# Patient Record
Sex: FEMALE
[De-identification: names, ages, dates, MRNs, and addresses within clinical notes are randomized per-mention and may not be internally consistent; named-entity substitution may affect disease eponyms.]

---

## 2019-01-10 ENCOUNTER — APPOINTMENT (OUTPATIENT)
Dept: HEART AND VASCULAR | Facility: CLINIC | Age: 59
End: 2019-01-10
Payer: COMMERCIAL

## 2019-01-10 VITALS
WEIGHT: 158 LBS | SYSTOLIC BLOOD PRESSURE: 122 MMHG | HEIGHT: 63 IN | HEART RATE: 97 BPM | BODY MASS INDEX: 28 KG/M2 | RESPIRATION RATE: 16 BRPM | DIASTOLIC BLOOD PRESSURE: 78 MMHG

## 2019-01-10 DIAGNOSIS — R06.09 OTHER FORMS OF DYSPNEA: ICD-10-CM

## 2019-01-10 PROBLEM — Z00.00 ENCOUNTER FOR PREVENTIVE HEALTH EXAMINATION: Status: ACTIVE | Noted: 2019-01-10

## 2019-01-10 PROCEDURE — 99203 OFFICE O/P NEW LOW 30 MIN: CPT

## 2019-01-10 PROCEDURE — ZZZZZ: CPT

## 2019-01-10 NOTE — DISCUSSION/SUMMARY
[FreeTextEntry1] : Assessment/Plan:\par \par Patient is a 52 year old female without medical history here for cardiac evaluation.\par \par -Complaining of exertional SOB and fatigue. EKG without ischemic changes. Check echo - check EF, r/o structural heart disease\par \par - BP controlled\par \par FU 1 month

## 2019-01-10 NOTE — PHYSICAL EXAM
[General Appearance - Well Developed] : well developed [Normal Appearance] : normal appearance [Well Groomed] : well groomed [General Appearance - Well Nourished] : well nourished [No Deformities] : no deformities [General Appearance - In No Acute Distress] : no acute distress [Normal Conjunctiva] : the conjunctiva exhibited no abnormalities [Eyelids - No Xanthelasma] : the eyelids demonstrated no xanthelasmas [Normal Oral Mucosa] : normal oral mucosa [No Oral Pallor] : no oral pallor [No Oral Cyanosis] : no oral cyanosis [Normal Jugular Venous A Waves Present] : normal jugular venous A waves present [Normal Jugular Venous V Waves Present] : normal jugular venous V waves present [No Jugular Venous Dickinson A Waves] : no jugular venous dickinson A waves [Heart Rate And Rhythm] : heart rate and rhythm were normal [Heart Sounds] : normal S1 and S2 [Murmurs] : no murmurs present [Respiration, Rhythm And Depth] : normal respiratory rhythm and effort [Exaggerated Use Of Accessory Muscles For Inspiration] : no accessory muscle use [Auscultation Breath Sounds / Voice Sounds] : lungs were clear to auscultation bilaterally [Abdomen Soft] : soft [Abdomen Tenderness] : non-tender [Abdomen Mass (___ Cm)] : no abdominal mass palpated [Abnormal Walk] : normal gait [Gait - Sufficient For Exercise Testing] : the gait was sufficient for exercise testing [Nail Clubbing] : no clubbing of the fingernails [Cyanosis, Localized] : no localized cyanosis [Petechial Hemorrhages (___cm)] : no petechial hemorrhages [Skin Color & Pigmentation] : normal skin color and pigmentation [] : no rash [No Venous Stasis] : no venous stasis [Skin Lesions] : no skin lesions [No Skin Ulcers] : no skin ulcer [No Xanthoma] : no  xanthoma was observed [Oriented To Time, Place, And Person] : oriented to person, place, and time [Affect] : the affect was normal [Mood] : the mood was normal [No Anxiety] : not feeling anxious

## 2019-01-10 NOTE — HISTORY OF PRESENT ILLNESS
[FreeTextEntry1] : Patient is a 52 year old female without medical history here for cardiac evaluation. She complains of occasional shortness of breath and fatigue when walking 20 mins, climbing 2 flight of stairs, relieved with rest. No overt chest pain. Symptoms ongoing for last few months. No dizziness,palpitations.

## 2019-02-28 ENCOUNTER — APPOINTMENT (OUTPATIENT)
Dept: HEART AND VASCULAR | Facility: CLINIC | Age: 59
End: 2019-02-28

## 2019-02-28 PROBLEM — Z00.00 ENCOUNTER FOR PREVENTIVE HEALTH EXAMINATION: Status: ACTIVE | Noted: 2019-02-28

## 2020-09-10 ENCOUNTER — APPOINTMENT (OUTPATIENT)
Dept: HEART AND VASCULAR | Facility: CLINIC | Age: 60
End: 2020-09-10
Payer: MEDICAID

## 2020-09-10 DIAGNOSIS — R06.00 DYSPNEA, UNSPECIFIED: ICD-10-CM

## 2020-09-10 PROCEDURE — 99203 OFFICE O/P NEW LOW 30 MIN: CPT

## 2020-09-10 PROCEDURE — 93000 ELECTROCARDIOGRAM COMPLETE: CPT

## 2020-09-16 VITALS
WEIGHT: 175 LBS | HEART RATE: 87 BPM | SYSTOLIC BLOOD PRESSURE: 150 MMHG | DIASTOLIC BLOOD PRESSURE: 90 MMHG | BODY MASS INDEX: 32.2 KG/M2 | HEIGHT: 62 IN | RESPIRATION RATE: 16 BRPM

## 2020-09-16 PROBLEM — R06.00 DYSPNEA ON MINIMAL EXERTION: Status: ACTIVE | Noted: 2020-09-16

## 2020-10-15 ENCOUNTER — APPOINTMENT (OUTPATIENT)
Dept: HEART AND VASCULAR | Facility: CLINIC | Age: 60
End: 2020-10-15
Payer: MEDICAID

## 2020-10-15 DIAGNOSIS — I10 ESSENTIAL (PRIMARY) HYPERTENSION: ICD-10-CM

## 2020-10-15 PROCEDURE — 99213 OFFICE O/P EST LOW 20 MIN: CPT

## 2020-10-26 VITALS
WEIGHT: 162 LBS | RESPIRATION RATE: 16 BRPM | DIASTOLIC BLOOD PRESSURE: 80 MMHG | SYSTOLIC BLOOD PRESSURE: 128 MMHG | BODY MASS INDEX: 28.7 KG/M2 | HEART RATE: 94 BPM

## 2020-10-26 PROBLEM — I10 BENIGN ESSENTIAL HYPERTENSION: Status: ACTIVE | Noted: 2020-10-26

## 2020-10-26 RX ORDER — LISINOPRIL 10 MG/1
10 TABLET ORAL DAILY
Qty: 1 | Refills: 0 | Status: ACTIVE | COMMUNITY
Start: 2020-10-26

## 2020-10-26 NOTE — DISCUSSION/SUMMARY
[FreeTextEntry1] : Assessment/Plan:\par \par Patient is a 52 year old female without medical history here for follow up\par \par -ECHO reviewed: EF 60, impaired relaxation. Exertional dyspnea likely due to deconditioning. Encouraged increased exercise capacity\par \par -HTN: BP controlled on lisinopril\par \par Cardiac status stable. FU 6 months

## 2020-10-26 NOTE — PHYSICAL EXAM
[General Appearance - Well Developed] : well developed [Normal Appearance] : normal appearance [Well Groomed] : well groomed [General Appearance - Well Nourished] : well nourished [No Deformities] : no deformities [General Appearance - In No Acute Distress] : no acute distress [Normal Conjunctiva] : the conjunctiva exhibited no abnormalities [Eyelids - No Xanthelasma] : the eyelids demonstrated no xanthelasmas [Normal Oral Mucosa] : normal oral mucosa [No Oral Pallor] : no oral pallor [No Oral Cyanosis] : no oral cyanosis [Normal Jugular Venous A Waves Present] : normal jugular venous A waves present [Normal Jugular Venous V Waves Present] : normal jugular venous V waves present [No Jugular Venous Dickinson A Waves] : no jugular venous dickinson A waves [Respiration, Rhythm And Depth] : normal respiratory rhythm and effort [Exaggerated Use Of Accessory Muscles For Inspiration] : no accessory muscle use [Auscultation Breath Sounds / Voice Sounds] : lungs were clear to auscultation bilaterally [Heart Rate And Rhythm] : heart rate and rhythm were normal [Heart Sounds] : normal S1 and S2 [Murmurs] : no murmurs present [Abdomen Soft] : soft [Abdomen Tenderness] : non-tender [Abdomen Mass (___ Cm)] : no abdominal mass palpated [Abnormal Walk] : normal gait [Gait - Sufficient For Exercise Testing] : the gait was sufficient for exercise testing [Nail Clubbing] : no clubbing of the fingernails [Cyanosis, Localized] : no localized cyanosis [Petechial Hemorrhages (___cm)] : no petechial hemorrhages [Skin Color & Pigmentation] : normal skin color and pigmentation [] : no rash [No Venous Stasis] : no venous stasis [Skin Lesions] : no skin lesions [No Skin Ulcers] : no skin ulcer [No Xanthoma] : no  xanthoma was observed [Oriented To Time, Place, And Person] : oriented to person, place, and time [Affect] : the affect was normal [Mood] : the mood was normal [No Anxiety] : not feeling anxious

## 2020-10-26 NOTE — HISTORY OF PRESENT ILLNESS
[FreeTextEntry1] : Patient is a 52 year old female without medical history here for follow up  echo results. Complaining of occasional shortness of breath and fatigue when walking 20 mins, climbing 2 flight of stairs, relieved with rest. No overt chest pain. Symptoms ongoing for last few months. No dizziness,palpitations.

## 2023-03-13 VITALS
TEMPERATURE: 98 F | SYSTOLIC BLOOD PRESSURE: 159 MMHG | RESPIRATION RATE: 16 BRPM | WEIGHT: 169.98 LBS | HEART RATE: 82 BPM | HEIGHT: 64 IN | DIASTOLIC BLOOD PRESSURE: 85 MMHG | OXYGEN SATURATION: 100 %

## 2023-03-13 RX ORDER — CHLORHEXIDINE GLUCONATE 213 G/1000ML
1 SOLUTION TOPICAL ONCE
Refills: 0 | Status: DISCONTINUED | OUTPATIENT
Start: 2023-03-23 | End: 2023-04-06

## 2023-03-13 NOTE — H&P ADULT - NSHPLABSRESULTS_GEN_ALL_CORE
LABS:                          12.7   9.60  )-----------( 275      ( 23 Mar 2023 08:04 )             40.4     03-23    141  |  102  |  x   ----------------------------<  x   4.1   |  x   |  x       EKG - NSR 73 bpm, Qw in I, AVL; TWI in III

## 2023-03-13 NOTE — H&P ADULT - HISTORY OF PRESENT ILLNESS
Cardiologist: Dr. Shaik Andres Singh  Pharmacy: [facility] ([address] [place] [id number])  Escort:    62 y/o Female w/ PMHx of HTN, Type 2 Diabetes, Carotid artery occlusion presented to outpatient cardiologist c/o chest pain a/w SOB. Patient underwent TTE 2/6/23: LVEF 61%, mild MR/TR. Patient subsequently underwent NST 2/25/23 revealing moderate anteroseptal and inferior ischemia noted, post-gated LVEF 68%. Denies headache, changes in vision, palpitations, abdominal pain, N/V/D, leg pain/edema or any other complaints. In light of pt's risk factors, CCS class ___ anginal/anginal-equivalent symptoms and abnormal NST; pt referred to [facility] for recommended cardiac catheterization w/ possible intervention if clinically indicated.  USED - [id number]    Cardiologist: Dr. Shaik Andres Singh  Pharmacy: [facility] ([address] [place] [id number])  Escort: Maria Victoria (friend)    64 y/o Female w/ PMHx of HTN, HLD, Type 2 Diabetes, Carotid artery occlusion presented to outpatient cardiologist c/o chest pain a/w SOB. Patient underwent TTE 2/6/23: LVEF 61%, mild MR/TR. Patient subsequently underwent NST 2/25/23 revealing moderate anteroseptal and inferior ischemia noted, post-gated LVEF 68%. Denies headache, changes in vision, palpitations, abdominal pain, N/V/D, leg pain/edema or any other complaints. In light of pt's risk factors, CCS class 3 anginal/anginal-equivalent symptoms and abnormal NST; pt referred to [facility] for recommended cardiac catheterization w/ possible intervention if clinically indicated.

## 2023-03-13 NOTE — H&P ADULT - ASSESSMENT
62 y/o Female w/ PMHx of HTN, HLD, Type 2 Diabetes, Carotid artery occlusion presents for coronary catheterization with possible intervention in light of pt risk factors, CCS Class III anginal symptoms, and known abnormal NST.    - ASA 2 Malampati 2  - VSS  - Patient is a candidate for moderate sedation  - Labs and EKG reviewed by PA  - LOAD - Aspirin 81 mg (long time adherence, missed AM dose), Plavix 600 mg   - FLUIDS -  ml bolus given x1; NS 75 ml/hr given x 2hr     Risks & benefits of procedure and alternative therapy have been explained to the patient including but not limited to: allergic reaction, bleeding w/possible need for blood transfusion, infection, renal and vascular compromise, limb damage, arrhythmia, stroke, vessel dissection/perforation, Myocardial infarction, emergent CABG. Informed consent obtained and in chart.

## 2023-03-13 NOTE — H&P ADULT - NSICDXPASTMEDICALHX_GEN_ALL_CORE_FT
PAST MEDICAL HISTORY:  Carotid artery occlusion     HLD (hyperlipidemia)     HTN (hypertension)     Type 2 diabetes mellitus

## 2023-03-23 ENCOUNTER — OUTPATIENT (OUTPATIENT)
Dept: OUTPATIENT SERVICES | Facility: HOSPITAL | Age: 63
LOS: 1 days | Discharge: HOME | End: 2023-03-23
Payer: MEDICAID

## 2023-03-23 LAB
A1C WITH ESTIMATED AVERAGE GLUCOSE RESULT: 6.1 % — HIGH (ref 4–5.6)
ALBUMIN SERPL ELPH-MCNC: 4.4 G/DL — SIGNIFICANT CHANGE UP (ref 3.3–5)
ALP SERPL-CCNC: 98 U/L — SIGNIFICANT CHANGE UP (ref 40–120)
ALT FLD-CCNC: 15 U/L — SIGNIFICANT CHANGE UP (ref 10–45)
ANION GAP SERPL CALC-SCNC: 9 MMOL/L — SIGNIFICANT CHANGE UP (ref 5–17)
APTT BLD: 32 SEC — SIGNIFICANT CHANGE UP (ref 27.5–35.5)
AST SERPL-CCNC: 21 U/L — SIGNIFICANT CHANGE UP (ref 10–40)
BASOPHILS # BLD AUTO: 0.04 K/UL — SIGNIFICANT CHANGE UP (ref 0–0.2)
BASOPHILS NFR BLD AUTO: 0.4 % — SIGNIFICANT CHANGE UP (ref 0–2)
BILIRUB SERPL-MCNC: 0.3 MG/DL — SIGNIFICANT CHANGE UP (ref 0.2–1.2)
BUN SERPL-MCNC: 14 MG/DL — SIGNIFICANT CHANGE UP (ref 7–23)
CALCIUM SERPL-MCNC: 9.5 MG/DL — SIGNIFICANT CHANGE UP (ref 8.4–10.5)
CHLORIDE SERPL-SCNC: 102 MMOL/L — SIGNIFICANT CHANGE UP (ref 96–108)
CHOLEST SERPL-MCNC: 108 MG/DL — SIGNIFICANT CHANGE UP
CK MB CFR SERPL CALC: 2 NG/ML — SIGNIFICANT CHANGE UP (ref 0–6.7)
CK SERPL-CCNC: 120 U/L — SIGNIFICANT CHANGE UP (ref 25–170)
CO2 SERPL-SCNC: 30 MMOL/L — SIGNIFICANT CHANGE UP (ref 22–31)
CREAT SERPL-MCNC: 0.7 MG/DL — SIGNIFICANT CHANGE UP (ref 0.5–1.3)
EGFR: 98 ML/MIN/1.73M2 — SIGNIFICANT CHANGE UP
EOSINOPHIL # BLD AUTO: 0.07 K/UL — SIGNIFICANT CHANGE UP (ref 0–0.5)
EOSINOPHIL NFR BLD AUTO: 0.7 % — SIGNIFICANT CHANGE UP (ref 0–6)
ESTIMATED AVERAGE GLUCOSE: 128 MG/DL — HIGH (ref 68–114)
GLUCOSE BLDC GLUCOMTR-MCNC: 107 MG/DL — HIGH (ref 70–99)
GLUCOSE BLDC GLUCOMTR-MCNC: 99 MG/DL — SIGNIFICANT CHANGE UP (ref 70–99)
GLUCOSE SERPL-MCNC: 109 MG/DL — HIGH (ref 70–99)
HCT VFR BLD CALC: 40.4 % — SIGNIFICANT CHANGE UP (ref 34.5–45)
HDLC SERPL-MCNC: 48 MG/DL — LOW
HGB BLD-MCNC: 12.7 G/DL — SIGNIFICANT CHANGE UP (ref 11.5–15.5)
IMM GRANULOCYTES NFR BLD AUTO: 0.1 % — SIGNIFICANT CHANGE UP (ref 0–0.9)
INR BLD: 1.06 — SIGNIFICANT CHANGE UP (ref 0.88–1.16)
LIPID PNL WITH DIRECT LDL SERPL: 46 MG/DL — SIGNIFICANT CHANGE UP
LYMPHOCYTES # BLD AUTO: 3.55 K/UL — HIGH (ref 1–3.3)
LYMPHOCYTES # BLD AUTO: 37 % — SIGNIFICANT CHANGE UP (ref 13–44)
MCHC RBC-ENTMCNC: 29.2 PG — SIGNIFICANT CHANGE UP (ref 27–34)
MCHC RBC-ENTMCNC: 31.4 GM/DL — LOW (ref 32–36)
MCV RBC AUTO: 92.9 FL — SIGNIFICANT CHANGE UP (ref 80–100)
MONOCYTES # BLD AUTO: 0.61 K/UL — SIGNIFICANT CHANGE UP (ref 0–0.9)
MONOCYTES NFR BLD AUTO: 6.4 % — SIGNIFICANT CHANGE UP (ref 2–14)
NEUTROPHILS # BLD AUTO: 5.32 K/UL — SIGNIFICANT CHANGE UP (ref 1.8–7.4)
NEUTROPHILS NFR BLD AUTO: 55.4 % — SIGNIFICANT CHANGE UP (ref 43–77)
NON HDL CHOLESTEROL: 60 MG/DL — SIGNIFICANT CHANGE UP
NRBC # BLD: 0 /100 WBCS — SIGNIFICANT CHANGE UP (ref 0–0)
PLATELET # BLD AUTO: 275 K/UL — SIGNIFICANT CHANGE UP (ref 150–400)
POTASSIUM SERPL-MCNC: 4.1 MMOL/L — SIGNIFICANT CHANGE UP (ref 3.5–5.3)
POTASSIUM SERPL-SCNC: 4.1 MMOL/L — SIGNIFICANT CHANGE UP (ref 3.5–5.3)
PROT SERPL-MCNC: 7.5 G/DL — SIGNIFICANT CHANGE UP (ref 6–8.3)
PROTHROM AB SERPL-ACNC: 12.6 SEC — SIGNIFICANT CHANGE UP (ref 10.5–13.4)
RBC # BLD: 4.35 M/UL — SIGNIFICANT CHANGE UP (ref 3.8–5.2)
RBC # FLD: 12.8 % — SIGNIFICANT CHANGE UP (ref 10.3–14.5)
SODIUM SERPL-SCNC: 141 MMOL/L — SIGNIFICANT CHANGE UP (ref 135–145)
TRIGL SERPL-MCNC: 70 MG/DL — SIGNIFICANT CHANGE UP
TROPONIN T SERPL-MCNC: 0.01 NG/ML — SIGNIFICANT CHANGE UP (ref 0–0.01)
WBC # BLD: 9.6 K/UL — SIGNIFICANT CHANGE UP (ref 3.8–10.5)
WBC # FLD AUTO: 9.6 K/UL — SIGNIFICANT CHANGE UP (ref 3.8–10.5)

## 2023-03-23 PROCEDURE — 82553 CREATINE MB FRACTION: CPT

## 2023-03-23 PROCEDURE — C1769: CPT

## 2023-03-23 PROCEDURE — 93458 L HRT ARTERY/VENTRICLE ANGIO: CPT | Mod: 26,59

## 2023-03-23 PROCEDURE — 84484 ASSAY OF TROPONIN QUANT: CPT

## 2023-03-23 PROCEDURE — C1894: CPT

## 2023-03-23 PROCEDURE — 80061 LIPID PANEL: CPT

## 2023-03-23 PROCEDURE — 85730 THROMBOPLASTIN TIME PARTIAL: CPT

## 2023-03-23 PROCEDURE — 80053 COMPREHEN METABOLIC PANEL: CPT

## 2023-03-23 PROCEDURE — 83036 HEMOGLOBIN GLYCOSYLATED A1C: CPT

## 2023-03-23 PROCEDURE — 85610 PROTHROMBIN TIME: CPT

## 2023-03-23 PROCEDURE — 82962 GLUCOSE BLOOD TEST: CPT

## 2023-03-23 PROCEDURE — 93458 L HRT ARTERY/VENTRICLE ANGIO: CPT

## 2023-03-23 PROCEDURE — 99152 MOD SED SAME PHYS/QHP 5/>YRS: CPT

## 2023-03-23 PROCEDURE — 93005 ELECTROCARDIOGRAM TRACING: CPT

## 2023-03-23 PROCEDURE — 85025 COMPLETE CBC W/AUTO DIFF WBC: CPT

## 2023-03-23 PROCEDURE — C1887: CPT

## 2023-03-23 PROCEDURE — 93010 ELECTROCARDIOGRAM REPORT: CPT

## 2023-03-23 PROCEDURE — 82550 ASSAY OF CK (CPK): CPT

## 2023-03-23 RX ORDER — SODIUM CHLORIDE 9 MG/ML
500 INJECTION INTRAMUSCULAR; INTRAVENOUS; SUBCUTANEOUS
Refills: 0 | Status: DISCONTINUED | OUTPATIENT
Start: 2023-03-23 | End: 2023-04-06

## 2023-03-23 RX ORDER — ACETAMINOPHEN 500 MG
650 TABLET ORAL ONCE
Refills: 0 | Status: COMPLETED | OUTPATIENT
Start: 2023-03-23 | End: 2023-03-23

## 2023-03-23 RX ORDER — SODIUM CHLORIDE 9 MG/ML
1000 INJECTION INTRAMUSCULAR; INTRAVENOUS; SUBCUTANEOUS
Refills: 0 | Status: DISCONTINUED | OUTPATIENT
Start: 2023-03-23 | End: 2023-03-23

## 2023-03-23 RX ORDER — ASPIRIN/CALCIUM CARB/MAGNESIUM 324 MG
81 TABLET ORAL ONCE
Refills: 0 | Status: COMPLETED | OUTPATIENT
Start: 2023-03-23 | End: 2023-03-23

## 2023-03-23 RX ORDER — SODIUM CHLORIDE 9 MG/ML
250 INJECTION INTRAMUSCULAR; INTRAVENOUS; SUBCUTANEOUS ONCE
Refills: 0 | Status: COMPLETED | OUTPATIENT
Start: 2023-03-23 | End: 2023-03-23

## 2023-03-23 RX ORDER — CLOPIDOGREL BISULFATE 75 MG/1
600 TABLET, FILM COATED ORAL ONCE
Refills: 0 | Status: COMPLETED | OUTPATIENT
Start: 2023-03-23 | End: 2023-03-23

## 2023-03-23 RX ADMIN — SODIUM CHLORIDE 250 MILLILITER(S): 9 INJECTION INTRAMUSCULAR; INTRAVENOUS; SUBCUTANEOUS at 09:16

## 2023-03-23 RX ADMIN — Medication 81 MILLIGRAM(S): at 09:16

## 2023-03-23 RX ADMIN — Medication 650 MILLIGRAM(S): at 15:48

## 2023-03-23 RX ADMIN — SODIUM CHLORIDE 205 MILLILITER(S): 9 INJECTION INTRAMUSCULAR; INTRAVENOUS; SUBCUTANEOUS at 12:23

## 2023-03-23 RX ADMIN — CLOPIDOGREL BISULFATE 600 MILLIGRAM(S): 75 TABLET, FILM COATED ORAL at 09:16

## 2023-03-23 RX ADMIN — SODIUM CHLORIDE 75 MILLILITER(S): 9 INJECTION INTRAMUSCULAR; INTRAVENOUS; SUBCUTANEOUS at 09:16

## 2023-03-23 RX ADMIN — Medication 650 MILLIGRAM(S): at 15:18

## 2023-03-23 NOTE — PROGRESS NOTE ADULT - SUBJECTIVE AND OBJECTIVE BOX
Interventional Cardiology PA SDA Discharge Note    Patient without complaints. Ambulated and voided without difficulties    Afebrile, VSS    Ext:  Right Radial: no hematoma, no bleeding, dressing; C/D/I      Pulses:    intact RAD back to baseline       64 y/o Female w/ PMHx of HTN, HLD, Type 2 Diabetes, Carotid artery occlusion presented to outpatient cardiologist c/o chest pain a/w SOB. Patient underwent TTE 2/6/23: LVEF 61%, mild MR/TR. Patient subsequently underwent NST 2/25/23 revealing moderate anteroseptal and inferior ischemia noted, post-gated LVEF 68%. Denies headache, changes in vision, palpitations, abdominal pain, N/V/D, leg pain/edema or any other complaints. In light of pt's risk factors, CCS class 3 anginal/anginal-equivalent symptoms and abnormal NST; pt referred to [facility] for recommended cardiac catheterization w/ possible intervention if clinically indicated.    Pt underwent cardiac cath 3/23/2023 which revealed RCA normal, LM normal, mLAD 20%, D1 normal, LCx mild irregularities, LVEDP 5mmHg. Access site R radial, TR removed without any complications.                  1.	Stable for discharge as per attending Dr. Merino after bed rest, pt voids, groin/wrist stable and 30 minutes of ambulation.  2.	Follow-up with Cardiologist, Dr. Shaik Andres Singh in 1-2 weeks  3.	Discharged forms signed and copies in chart   4.         Please resume your Metformin 500mg BID on Monday 3/27/2023.    Interventional Cardiology PA SDA Discharge Note    Patient without complaints. Ambulated and voided without difficulties    Afebrile, VSS    Ext:  Right Radial: no hematoma, no bleeding, dressing; C/D/I      Pulses:    intact RAD back to baseline       64 y/o Female w/ PMHx of HTN, HLD, Type 2 Diabetes, Carotid artery occlusion presented to outpatient cardiologist c/o chest pain a/w SOB. Patient underwent TTE 2/6/23: LVEF 61%, mild MR/TR. Patient subsequently underwent NST 2/25/23 revealing moderate anteroseptal and inferior ischemia noted, post-gated LVEF 68%. Denies headache, changes in vision, palpitations, abdominal pain, N/V/D, leg pain/edema or any other complaints. In light of pt's risk factors, CCS class 3 anginal/anginal-equivalent symptoms and abnormal NST; pt referred to [facility] for recommended cardiac catheterization w/ possible intervention if clinically indicated.    Pt underwent cardiac cath 3/23/2023 which revealed RCA normal, LM normal, mLAD 20%, D1 normal, LCx mild irregularities, LVEDP 5mmHg. Access site R radial, TR removed without any complications.                  1.	Stable for discharge as per attending Dr. Merino after bed rest, pt voids, wrist stable and 30 minutes of ambulation.  2.	Follow-up with Cardiologist, Dr. Shaik Andres Singh in 1-2 weeks  3.	Discharged forms signed and copies in chart   4.         Please resume your Metformin 500mg BID on Monday 3/27/2023.

## 2023-04-05 DIAGNOSIS — R94.39 ABNORMAL RESULT OF OTHER CARDIOVASCULAR FUNCTION STUDY: ICD-10-CM

## 2023-04-05 DIAGNOSIS — I25.110 ATHEROSCLEROTIC HEART DISEASE OF NATIVE CORONARY ARTERY WITH UNSTABLE ANGINA PECTORIS: ICD-10-CM

## 2023-04-05 RX ORDER — ASPIRIN/CALCIUM CARB/MAGNESIUM 324 MG
1 TABLET ORAL
Qty: 0 | Refills: 0 | DISCHARGE

## 2023-04-05 RX ORDER — ROSUVASTATIN CALCIUM 5 MG/1
1 TABLET ORAL
Qty: 0 | Refills: 0 | DISCHARGE

## 2023-04-05 RX ORDER — METFORMIN HYDROCHLORIDE 850 MG/1
1 TABLET ORAL
Qty: 0 | Refills: 0 | DISCHARGE

## 2023-04-05 RX ORDER — LISINOPRIL 2.5 MG/1
1 TABLET ORAL
Qty: 0 | Refills: 0 | DISCHARGE

## 2023-04-05 RX ORDER — OMEPRAZOLE 10 MG/1
1 CAPSULE, DELAYED RELEASE ORAL
Qty: 0 | Refills: 0 | DISCHARGE